# Patient Record
Sex: FEMALE | Race: WHITE | HISPANIC OR LATINO | Employment: OTHER | URBAN - METROPOLITAN AREA
[De-identification: names, ages, dates, MRNs, and addresses within clinical notes are randomized per-mention and may not be internally consistent; named-entity substitution may affect disease eponyms.]

---

## 2024-09-12 ASSESSMENT — ENCOUNTER SYMPTOMS
HEARTBURN: 0
FEVER: 0
COUGH: 0
CHILLS: 0
WHEEZING: 0
PALPITATIONS: 0
DIZZINESS: 0
HEADACHES: 0
SHORTNESS OF BREATH: 0

## 2024-09-13 NOTE — PROGRESS NOTES
Pulmonary Hypertension Clinic- Follow up    Date of Service: 8/17/24    Referring Physician: No ref. provider found    Reason for Consult: Possible CTEPH    Chief Complaint:   Chief Complaint   Patient presents with    Pulmonary Hypertension     Last seen 06/10/24    Results     PFT 08/09/24, sleep appt 11/19/24       HPI:   Laurie Samuel is a 63 y.o. female referred to PH clinic for possible CTEPH, last seen 6/10/24.  She presented 3/29/2024 to [facility] with no significant past medical history who presented today with several weeks of dizziness and headache with shortness of breath. In the ER she was reporting continued symptoms getting in and out of bed. Her vitals are stable, she is saturating well on 2L of oxygen. She denies any personal or family history of VTE or pulmonary issues prior to this. No recent prolonged sedentary periods. She is up to date on her mammograms, colonoscopy and paps without any abnormal findings per daughter at bedside. She has no leg pain or redness.     Obstructive Sleep Apnea screening: STOP BANG high risk for + Snoring, + fatigue (napping), apneas, BP treatment and age > 50.     6/10/24 - Henrietta is feeling much better.  She is no longer requiring oxygen.  She is accompanied by her daughters who prefer to translate for their mom rather than an .  She has some bothersome phlegm production which is random and rare, denies cough.  Denies sinus congestion and GERD (currently, used to have GERD but stopped eating spices and now has resolved).  She is on eliquis which she gets at [facility].  In hospital she had sharp chest pain and palpitations which have both resolved.  She has no LE edema.  She was exposed to burning biomass fuels at her childhood home in [place] but is a never smoker personally. She worked for many years as a  and reports at least one incident of bleach related inhalational injury with difficulty breathing for several days  afterwards. She did not seek medical attention at the time.      Today 9/17/24 - Henrietta is feeling 100% improved.  She is not having shortness of breath or difficulty keeping up with family.  She was unable to get the VQ and echo done, it appears she did not understand it was ordered.  However they are cash pay and her PFT was very expensive.  The PFT is normal with a DLCO that is supranormal and therefore rules out the presence of significant CTEPH.  We discussed today that if [facility] can pay for the VQ or echo they should get it done, otherwise defer until her medicare goes through. If it is expensive I do not want her to get it as it will not .  The PE was unprovoked, therefore anticoagulation for life is recommended.  The PFT is reassuring that no CTEPH is present.  Even in the presence of CTED we can wait for definitive diagnosis until it is more affordable.  At this time I do not suspect that she has either.      No past medical history on file.    Past Surgical History:   Procedure Laterality Date    [place] UPPER GI ENDOSCOPY,DIAGNOSIS N/A 4/2/2024    Procedure: GASTROSCOPY;  Surgeon: Rich Jenkins M.D.;  Location: SURGERY SAME DAY [facility];  Service: Gastroenterology    [place] COLONOSCOPY,DIAGNOSTIC N/A 4/2/2024    Procedure: COLONOSCOPY;  Surgeon: Rich Jenkins M.D.;  Location: SURGERY SAME DAY [facility];  Service: Gastroenterology    [place] UPPER GI ENDOSCOPY,BIOPSY N/A 4/2/2024    Procedure: GASTROSCOPY, WITH BIOPSY;  Surgeon: Rich Jenkins M.D.;  Location: SURGERY SAME DAY [facility];  Service: Gastroenterology    UMBILICAL HERNIA REPAIR      Hernia Repair, Umbilical       Social History     Socioeconomic History    Marital status: Single     Spouse name: Not on file    Number of children: Not on file    Years of education: Not on file    Highest education level: Not on file   Occupational History    Not on file   Tobacco Use    Smoking status: Never    Smokeless tobacco: Never   Vaping Use     Vaping status: Never Used   Substance and Sexual Activity    Alcohol use: Never    Drug use: No    Sexual activity: Not on file   Other Topics Concern    Not on file   Social History Narrative    ** Merged History Encounter **          Social Determinants of Health     Financial Resource Strain: Not on file   Food Insecurity: Not on file   Transportation Needs: Not on file   Physical Activity: Not on file   Stress: Not on file   Social Connections: Not on file   Intimate Partner Violence: Not on file   Housing Stability: Not on file          No family history on file.    Current Outpatient Medications on File Prior to Visit   Medication Sig Dispense Refill    ferrous sulfate 325 (65 Fe) MG tablet Take 325 mg by mouth every 48 hours.      apixaban (ELIQUIS) 5mg Tab Take 1 Tablet by mouth 2 times a day. 60 Tablet 2    lisinopril (PRINIVIL) 20 MG Tab Take 1 Tablet by mouth every day. 30 Tablet 0    rosuvastatin (CRESTOR) 10 MG Tab Take 1 Tablet by mouth every day. 30 Tablet 0     No current facility-administered medications on file prior to visit.       Allergies: Patient has no known allergies.      ROS:   Review of Systems   Constitutional:  Negative for chills, fever and malaise/fatigue.   HENT:  Negative for congestion.    Respiratory:  Negative for cough, sputum production, shortness of breath and wheezing.    Cardiovascular:  Negative for chest pain, palpitations and leg swelling.   Gastrointestinal:  Negative for heartburn.   Neurological:  Negative for dizziness and headaches.   Endo/Heme/Allergies:  Negative for environmental allergies.       Vitals:  /74 (BP Location: Right arm, Patient Position: Sitting, BP Cuff Size: Large adult)   Pulse 77   Ht 1.524 m (5')   Wt 80.7 kg (178 lb)   SpO2 96%     Physical Exam:  Physical Exam  Constitutional:       Appearance: She is obese.   HENT:      Head: Atraumatic.   Eyes:      Extraocular Movements: Extraocular movements intact.   Cardiovascular:      Rate  and Rhythm: Normal rate and regular rhythm.   Pulmonary:      Effort: Pulmonary effort is normal. No respiratory distress.      Breath sounds: Normal breath sounds. No wheezing or rales.   Musculoskeletal:         General: No swelling.   Skin:     General: Skin is warm and dry.   Neurological:      General: No focal deficit present.      Mental Status: She is alert and oriented to person, place, and time.           Vaccinations:    Pneumovax: Vaccinated  Covid: Vaccinated  Annual Flu: Vaccinated    Pertinent Studies:  Laboratory Data:    6MWT:    PFTs as reviewed by me personally show:      Imaging as reviewed by me personally show:    3/29/24:  IMPRESSION:     1. Extensive bilateral pulmonary emboli, sparing the main pulmonary arteries, but involving bilateral lobar, segmental and subsegmental branches.  2. Right heart strain.     No pulmonary nodule to warrant further imaging follow-up recommendations as per Fleischner Society guidelines.    Pertinent Cardiac Studies:  Echo:  3/30/24:  CONCLUSIONS  Underfilled left ventricle with hyperdynamic systolic function.   The right ventricle is mildly dilated. Reduced right ventricular   systolic function.  Evidence of Mayberry's sign with hyperdynamic apex and free wall   dysfunction suggestive of RV strain as seen with acute pulmonary   embolism.  Estimated right ventricular systolic pressure is 68 mmHg; severely   elevated.  No pericardial effusion.     No prior study is available for comparison.     RHC:    Assessment/Plan:    Problem List Items Addressed This Visit       Bilateral pulmonary embolism (HCC)     Symptomatically resolved  Plan:  - Continue lifelong AC for unprovoked PE  ~ According to the PADIS PE trial extending anticoagulation is effective at preventing repeat VTE, but the risk of recurrent PE does not decrease with extended duration after stopping, meaning the risk is relatively high once anticoagulation stops regardless of how long it has been used  prior to termination.    ~The CHEST 2021 guidelines for unprovoked VTE in a patient with low bleeding risk are clear for extended anticoagulation.    ~ According to the CHEST 2016 guidelines the risk of recurrent VTE in a patient with unprovoked pulmonary embolism is 30% at 5 years.   ~ CHEST 2021 guidelines do recommend extended therapy with 1/2 dose NOAC which I believe is reasonable to consider after 6-12months of anticoagulation at full dose.    ~ The bleeding risk of 1/2 dose NOAC therapy is similar to that of aspirin ([facility] Choice Trial)            Pulmonary hypertension (HCC)     Unclear if present still.  DLCO is 140% so regardless it is not clinically significant at this time.  Plan:  - If able to get coverage will still obtain VQ and repeat Echo  - Henrietta may have GONZALEZ which we can pursue if VQ is negative and echo still shows elevated PA pressures  - Follow up in 1 year               Return in about 1 year (around 9/17/2025) for Dr. New in General Pulmonary Clinic .     This note was generated using voice recognition software which has a chance of producing errors of grammar and possibly content.  I have made every reasonable attempt to find and correct any obvious errors, but it should be expected that some may not be found prior to finalization of this note.    Time spent in record review prior to patient arrival, reviewing results, and in face-to-face encounter totaled 30 min, excluding any procedures if performed.      Pretty New MD RD  Pulmonary and Critical Care Medicine  [facility]

## 2024-09-17 ENCOUNTER — OFFICE VISIT (OUTPATIENT)
Dept: SLEEP MEDICINE | Facility: MEDICAL CENTER | Age: 64
End: 2024-09-17
Attending: INTERNAL MEDICINE

## 2024-09-17 VITALS
WEIGHT: 178 LBS | BODY MASS INDEX: 34.95 KG/M2 | HEIGHT: 60 IN | HEART RATE: 77 BPM | DIASTOLIC BLOOD PRESSURE: 74 MMHG | SYSTOLIC BLOOD PRESSURE: 130 MMHG | OXYGEN SATURATION: 96 %

## 2024-09-17 DIAGNOSIS — I26.99 BILATERAL PULMONARY EMBOLISM (HCC): ICD-10-CM

## 2024-09-17 DIAGNOSIS — I27.20 PULMONARY HYPERTENSION (HCC): ICD-10-CM

## 2024-09-17 PROCEDURE — 3075F SYST BP GE 130 - 139MM HG: CPT | Performed by: INTERNAL MEDICINE

## 2024-09-17 PROCEDURE — 99214 OFFICE O/P EST MOD 30 MIN: CPT | Performed by: INTERNAL MEDICINE

## 2024-09-17 PROCEDURE — 3078F DIAST BP <80 MM HG: CPT | Performed by: INTERNAL MEDICINE

## 2024-09-17 PROCEDURE — 99211 OFF/OP EST MAY X REQ PHY/QHP: CPT | Performed by: INTERNAL MEDICINE

## 2024-09-17 ASSESSMENT — ENCOUNTER SYMPTOMS: SPUTUM PRODUCTION: 0

## 2024-09-17 ASSESSMENT — FIBROSIS 4 INDEX: FIB4 SCORE: 1.03

## 2024-09-17 NOTE — ASSESSMENT & PLAN NOTE
Unclear if present still.  DLCO is 140% so regardless it is not clinically significant at this time.  Plan:  - If able to get coverage will still obtain VQ and repeat Echo  - Henrietta may have GONZALEZ which we can pursue if VQ is negative and echo still shows elevated PA pressures  - Follow up in 1 year

## 2024-09-17 NOTE — ASSESSMENT & PLAN NOTE
Symptomatically resolved  Plan:  - Continue lifelong AC for unprovoked PE  ~ According to the PADIS PE trial extending anticoagulation is effective at preventing repeat VTE, but the risk of recurrent PE does not decrease with extended duration after stopping, meaning the risk is relatively high once anticoagulation stops regardless of how long it has been used prior to termination.    ~The CHEST 2021 guidelines for unprovoked VTE in a patient with low bleeding risk are clear for extended anticoagulation.    ~ According to the CHEST 2016 guidelines the risk of recurrent VTE in a patient with unprovoked pulmonary embolism is 30% at 5 years.   ~ CHEST 2021 guidelines do recommend extended therapy with 1/2 dose NOAC which I believe is reasonable to consider after 6-12months of anticoagulation at full dose.    ~ The bleeding risk of 1/2 dose NOAC therapy is similar to that of aspirin ([facility] Choice Trial)

## 2024-10-22 ENCOUNTER — HOSPITAL ENCOUNTER (EMERGENCY)
Facility: MEDICAL CENTER | Age: 64
End: 2024-10-22
Attending: EMERGENCY MEDICINE

## 2024-10-22 VITALS
RESPIRATION RATE: 14 BRPM | WEIGHT: 177.03 LBS | HEIGHT: 64 IN | SYSTOLIC BLOOD PRESSURE: 177 MMHG | HEART RATE: 82 BPM | DIASTOLIC BLOOD PRESSURE: 92 MMHG | BODY MASS INDEX: 30.22 KG/M2 | TEMPERATURE: 98.3 F | OXYGEN SATURATION: 98 %

## 2024-10-22 DIAGNOSIS — H57.12 LEFT EYE PAIN: ICD-10-CM

## 2024-10-22 DIAGNOSIS — H11.31 SUBCONJUNCTIVAL HEMORRHAGE OF RIGHT EYE: ICD-10-CM

## 2024-10-22 PROCEDURE — 99283 EMERGENCY DEPT VISIT LOW MDM: CPT

## 2024-10-22 PROCEDURE — 700101 HCHG RX REV CODE 250: Performed by: EMERGENCY MEDICINE

## 2024-10-22 RX ORDER — PROPARACAINE HYDROCHLORIDE 5 MG/ML
1 SOLUTION/ DROPS OPHTHALMIC ONCE
Status: COMPLETED | OUTPATIENT
Start: 2024-10-22 | End: 2024-10-22

## 2024-10-22 RX ADMIN — FLUORESCEIN SODIUM 1 MG: 1 STRIP OPHTHALMIC at 16:30

## 2024-10-22 RX ADMIN — PROPARACAINE HYDROCHLORIDE 1 DROP: 5 SOLUTION/ DROPS OPHTHALMIC at 16:30

## 2024-10-22 ASSESSMENT — FIBROSIS 4 INDEX: FIB4 SCORE: 1.03

## 2024-10-22 ASSESSMENT — PAIN DESCRIPTION - PAIN TYPE
TYPE: ACUTE PAIN
TYPE: ACUTE PAIN

## 2024-11-19 ENCOUNTER — OFFICE VISIT (OUTPATIENT)
Dept: SLEEP MEDICINE | Facility: MEDICAL CENTER | Age: 64
End: 2024-11-19
Attending: INTERNAL MEDICINE
Payer: MEDICARE

## 2024-11-19 VITALS
WEIGHT: 179 LBS | RESPIRATION RATE: 16 BRPM | HEART RATE: 82 BPM | SYSTOLIC BLOOD PRESSURE: 142 MMHG | HEIGHT: 60 IN | BODY MASS INDEX: 35.14 KG/M2 | DIASTOLIC BLOOD PRESSURE: 86 MMHG | OXYGEN SATURATION: 95 %

## 2024-11-19 DIAGNOSIS — G47.30 SLEEP DISORDER BREATHING: Primary | ICD-10-CM

## 2024-11-19 DIAGNOSIS — R06.89 GASPING FOR BREATH: ICD-10-CM

## 2024-11-19 DIAGNOSIS — I27.20 PULMONARY HYPERTENSION (HCC): ICD-10-CM

## 2024-11-19 DIAGNOSIS — G47.33 OBSTRUCTIVE SLEEP APNEA: ICD-10-CM

## 2024-11-19 DIAGNOSIS — R06.83 SNORING: ICD-10-CM

## 2024-11-19 DIAGNOSIS — F51.04 CHRONIC INSOMNIA: ICD-10-CM

## 2024-11-19 PROCEDURE — 99211 OFF/OP EST MAY X REQ PHY/QHP: CPT | Performed by: INTERNAL MEDICINE

## 2024-11-19 PROCEDURE — 3079F DIAST BP 80-89 MM HG: CPT | Performed by: STUDENT IN AN ORGANIZED HEALTH CARE EDUCATION/TRAINING PROGRAM

## 2024-11-19 PROCEDURE — 99214 OFFICE O/P EST MOD 30 MIN: CPT | Performed by: STUDENT IN AN ORGANIZED HEALTH CARE EDUCATION/TRAINING PROGRAM

## 2024-11-19 PROCEDURE — 3077F SYST BP >= 140 MM HG: CPT | Performed by: STUDENT IN AN ORGANIZED HEALTH CARE EDUCATION/TRAINING PROGRAM

## 2024-11-19 RX ORDER — ZOLPIDEM TARTRATE 5 MG/1
5 TABLET ORAL NIGHTLY PRN
Qty: 1 TABLET | Refills: 0 | Status: SHIPPED | OUTPATIENT
Start: 2024-11-19 | End: 2024-11-20

## 2024-11-19 ASSESSMENT — FIBROSIS 4 INDEX: FIB4 SCORE: 1.03

## 2024-11-19 NOTE — PROGRESS NOTES
[facility] Consult Note     Date: 11/19/2024 / Time: 9:44 AM      Thank you for requesting a sleep medicine consultation on Laurie Holliday at the sleep center. Presents today with the chief complaints of snoring, gasping in sleep, occasional excessive daytime sleepiness. She is referred by Pretty New M.D.  [address] [place] [address] for evaluation and treatment of sleep disorder breathing .     HISTORY OF PRESENT ILLNESS:     Laurie Holliday is a 64 y.o. female with history of pulmonary embolism on Eliquis, hypertension, pulmonary hypertension, obesity, and snoring.  Presents to Sleep Clinic for evaluation of sleep.     She is accompanied by her daughter at today's visit.  They deny formal .    She was admitted earlier this year due to pulmonary embolism.  She has since followed with pulmonology.  There was concern about potential sleep apnea.    She does have difficulty with getting to sleep and sometimes staying asleep.  During her sleep she does snore and she does have episodes of gasping.  She does not feel rested with awakening.  During the day she can have fatigue and low energy and excessive daytime sleepiness.  In addition she can have brain fog and trouble with irritability.  Her sleep is nonrestorative.  She does occasionally wake with headaches.    She does complain of restless legs at times.    As per supplemental questionnaire to be scanned or imported into chart:    [place] Sleepiness Score: 16    Sleep Schedule  Bedtime: Weekday & Weekend 10-11pm   Wake time: Weekday & Weekend 7am  Sleep-onset latency: 1-2 hours   Awakenings from sleep: 2  Difficulty falling back asleep: at times   Bedroom partner: grandchild   Naps: rare     DAYTIME SYMPTOMS:   Excessive daytime sleepiness: Yes  Daytime fatigue: Yes  Difficulty concentrating: Yes  Memory problems: Yes  Irritability:Yes  Work/school performance issues: No  "  Sleepiness with driving: No   Caffeine/stimulant use: Yes  Alcohol use:No     SLEEP RELATED SYMPTOMS  Snoring: Yes  Witnessed apnea or gasping/choking: Yes gasping   Dry mouth or mouth breathing: No   Sweating: No   Teeth grinding/biting: No   Morning headaches: Yes  Refreshed Upon Awakening: No      SLEEP RELATED BEHAVIORS:  Parasomnias (walking, talking, eating, violence): No   Leg kicking: No   Restless legs - \"urge to move\": Yes  Nightmares: Yes Recurrent: No   Dream enactment: No      NARCOLEPSY:  Cataplexy: No   Sleep paralysis: No   Sleep attacks: No   Hypnagogic/hypnopompic hallucinations: No     MEDICAL HISTORY  Past Medical History:   Diagnosis Date    Daytime sleepiness     Gasping for breath     Insomnia     Morning headache     Snoring         SURGICAL HISTORY  Past Surgical History:   Procedure Laterality Date    [place] UPPER GI ENDOSCOPY,DIAGNOSIS N/A 4/2/2024    Procedure: GASTROSCOPY;  Surgeon: Rich Jenkins M.D.;  Location: SURGERY SAME DAY [facility];  Service: Gastroenterology    [place] COLONOSCOPY,DIAGNOSTIC N/A 4/2/2024    Procedure: COLONOSCOPY;  Surgeon: Rich Jenkins M.D.;  Location: SURGERY SAME DAY [facility];  Service: Gastroenterology    [place] UPPER GI ENDOSCOPY,BIOPSY N/A 4/2/2024    Procedure: GASTROSCOPY, WITH BIOPSY;  Surgeon: Rich Jenkins M.D.;  Location: SURGERY SAME DAY [facility];  Service: Gastroenterology    UMBILICAL HERNIA REPAIR      Hernia Repair, Umbilical        FAMILY HISTORY  No family history on file.    SOCIAL HISTORY  Social History     Socioeconomic History    Marital status: Single   Tobacco Use    Smoking status: Never    Smokeless tobacco: Never   Vaping Use    Vaping status: Never Used   Substance and Sexual Activity    Alcohol use: Never    Drug use: No   Social History Narrative    ** Merged History Encounter **             Occupation: retired     CURRENT MEDICATIONS  Current Outpatient Medications   Medication Sig Dispense Refill    zolpidem (AMBIEN) 5 MG Tab Take 1 " Tablet by mouth at bedtime as needed for Sleep (1 tablet po qhs for insomnia/sleep study. Bring to sleep study for one day supply) for up to 1 day. 1 Tablet 0    ferrous sulfate 325 (65 Fe) MG tablet Take 325 mg by mouth every 48 hours.      apixaban (ELIQUIS) 5mg Tab Take 1 Tablet by mouth 2 times a day. 60 Tablet 2    lisinopril (PRINIVIL) 20 MG Tab Take 1 Tablet by mouth every day. 30 Tablet 0    rosuvastatin (CRESTOR) 10 MG Tab Take 1 Tablet by mouth every day. 30 Tablet 0     No current facility-administered medications for this visit.       REVIEW OF SYSTEMS  Constitutional: Denies fevers, Denies weight changes  Ears/Nose/Throat/Mouth: Denies nasal congestion or sore throat   Cardiovascular: Denies chest pain  Respiratory: Exertional shortness of breath, Denies cough  Gastrointestinal/Hepatic: Denies nausea, vomiting  Sleep: see HPI    Physical Examination:  Vitals/ General Appearance:   Weight/BMI: Body mass index is 34.96 kg/m².  BP (!) 142/86 (BP Location: Left arm, Patient Position: Sitting, BP Cuff Size: Adult)   Pulse 82   Resp 16   Ht 1.524 m (5')   Wt 81.2 kg (179 lb)   SpO2 95%   Vitals:    11/19/24 0941   BP: (!) 142/86   BP Location: Left arm   Patient Position: Sitting   BP Cuff Size: Adult   Pulse: 82   Resp: 16   SpO2: 95%   Weight: 81.2 kg (179 lb)   Height: 1.524 m (5')       Pt. is alert and oriented to time, place and person. Cooperative and in no apparent distress.     Constitutional: Alert, no distress, well-groomed.  Skin: No rashes in visible areas.  Eye: Round. Conjunctiva clear, lids normal. No icterus.   ENT EXAM  Nasal alae/valves collapsible: No   Nasal septum deviation: Yes  Nasal turbinate hypertrophy:No  Hard palate narrow: No   Hard palate high: No   Soft palate/uvula (Mallampati score): 4  Tongue Scalloping: No   Retrognathia: No   Micrognathia: No   Cardiovascular:no murmus/gallops/rubs, normal S1 and S2 heart sounds, regular rate and rhythm  Pulmonary:Clear to  auscultation, No wheezes, No crackles.  Neurologic:Awake, alert and oriented x 3, Normal age appropriate gait, No involuntary motions.  Extremities: No clubbing, cyanosis, or edema       ASSESSMENT AND PLAN   Laurie Holliday is a 64 y.o. female with history of pulmonary embolism on Eliquis, hypertension, pulmonary hypertension, obesity, and snoring.  Presents to Sleep Clinic for evaluation of sleep.     1) Laurie Holliday  has symptoms of Obstructive Sleep Apnea (GONZALEZ). Laurie Holliday has symptoms of snoring, choking/gasping during sleep, daytime sleepiness morning headaches, unrefreshed upon awakening. These can interfere with activities of daily living.   ESS 16  Pt has risk factors for GONZALEZ include obesity, age, and crowded oropharynx.     The pathophysiology of GONZALEZ and the increased risk of cardiovascular morbidity from untreated GONZALEZ is discussed in detail with the patient. She  also has HTN, pulmonary hypertension which can be worsened by GONZALEZ.      We have discussed diagnostic options including in-laboratory, attended polysomnography and home sleep testing. We have also discussed treatment options including airway pressurization, reconstructive otolaryngologic surgery, dental appliances and weight management.     Subsequently,treatment options will be discussed based on the diagnostic study. Meanwhile, She is urged to avoid supine sleep, weight gain and alcoholic beverages since all of these can worsen GONZALEZ. She is cautioned against drowsy driving. If She feels sleepy while driving, advised must pull over for a break/nap, rather than persist on the road, in the interest of Pt's own safety and that of others on the road.    PLAN  -  She  will be scheduled for an overnight PSG  to assess sleep related breathing disorder.  -Sleep date prescribed for night of sleep study only  - Follow up 1-2 weeks after sleep study to discuss results and treatment options moving forward    -Advised to reach out via [facility] or by phone with any questions or concerns.     Chronic Insomnia   With prolonged sleep latency and feeling this is impacting daily functioning for years meets criteria for chronic insomnia. Discussed potential causes of insomnia and importance of having a routine around bedtime.  Reviewed that untreated sleep apnea can worsen insomnia.  Would recommend treating sleep apnea present and seeing if insomnia improves    RECOMMENDATIONS  -Maintain a regular sleep schedule  -Avoid caffeinated beverages after lunch, and alcohol in late afternoon and evening  -Avoid prolonged use of light-emitting screens before bedtime  -Exercise regularly for at least 20 minutes, preferably more than four to five hours prior to bedtime  -Avoid daytime naps, especially if they are longer than 20 to 30 minutes or occur late in the day      Please note portions of this record was created using voice recognition software. I have made every reasonable attempt to correct obvious errors, but I expect that there are errors of grammar and possibly content I did not discover before finalizing the note.

## 2024-11-20 ENCOUNTER — TELEPHONE (OUTPATIENT)
Dept: HEALTH INFORMATION MANAGEMENT | Facility: OTHER | Age: 64
End: 2024-11-20
Payer: MEDICARE

## 2024-12-01 ENCOUNTER — APPOINTMENT (OUTPATIENT)
Dept: SLEEP MEDICINE | Facility: MEDICAL CENTER | Age: 64
End: 2024-12-01
Attending: STUDENT IN AN ORGANIZED HEALTH CARE EDUCATION/TRAINING PROGRAM
Payer: MEDICARE

## 2024-12-01 DIAGNOSIS — G47.30 SLEEP DISORDER BREATHING: ICD-10-CM

## 2024-12-01 DIAGNOSIS — R06.89 GASPING FOR BREATH: ICD-10-CM

## 2024-12-01 DIAGNOSIS — R06.83 SNORING: ICD-10-CM

## 2024-12-01 DIAGNOSIS — I27.20 PULMONARY HYPERTENSION (HCC): ICD-10-CM

## 2024-12-01 PROCEDURE — 95810 POLYSOM 6/> YRS 4/> PARAM: CPT | Performed by: STUDENT IN AN ORGANIZED HEALTH CARE EDUCATION/TRAINING PROGRAM

## 2024-12-02 NOTE — PROCEDURES
Patient: INNA CALDERÓN  ID: [id number] Date: 12/1/2024   MONTAGE: Standard  STUDY TYPE: Diagnostic  RECORDING TECHNIQUE:   After the scalp was prepared, gold plated electrodes were applied to the scalp according to the International 10-20 System. EEG (electroencephalogram) was continuously monitored from the O1-M2, O2-M1, C3-M2, C4-M1, F3-M2, and F4-M1. EOGs (electrooculograms) were monitored by electrodes placed at the left and right outer canthi. Chin EMG (electromyogram) was monitored by electrodes placed on the mentalis and sub-mentalis muscles. Nasal and oral airflow were monitored using a triple port thermocouple as well as oronasal pressure transducer. Respiratory effort was measured by inductive plethysmography technology employing abdominal and thoracic belts. Blood oxygen saturation and pulse were monitored by pulse oximetry. Heart rhythm was monitored by surface electrocardiogram. Leg EMG was studied using surface electrodes placed on left and right anterior tibialis. A microphone was used to monitor tracheal sounds and snoring. Body position was monitored and documented by technician observation.   SCORING CRITERIA:   A modification of the AASM manual for scoring of sleep and associated events was used. Obstructive apneas were scored by cessation of airflow for at least 10 seconds with continuing respiratory effort. Central apneas were scored by cessation of airflow for at least 10 seconds with no respiratory effort. Hypopneas were scored by a 30% or more reduction in airflow for at least 10 seconds accompanied by arterial oxygen desaturation of 3% or an arousal. For CMS (Medicare) patients, per AASM rule 1B, hypopneas are scored by 30% with mild reduction in airflow for at least 10 seconds accompanied by arterial saturation decreased at 4%.    Study start time was 08:34:38 PM. Diagnostic recording time was 9h 3.5m with a total sleep time of 7h 10.0m resulting in a sleep efficiency of  79.12%%. Sleep latency from the start of the study was 25 minutes and the latency from sleep to REM was 65 minutes. In total,64 arousals were scored for an arousal index of 8.9.  Respiratory:  There were a total of 3 apneas consisting of 3 obstructive apneas, 0 mixed apneas, and 0 central apneas. A total of 56 hypopneas were scored. The apnea index was 0.42 per hour and the hypopnea index was 7.81 per hour resulting in an overall 4% AHI of 8.23. AHI during REM was 23.9 and AHI while supine was 11.24.  Oximetry:  There was a mean oxygen saturation of 93.0%. The minimum oxygen saturation in NREM was 89.0 % and in REM was 78.0%. The patient spent 11.2 minutes of TST with SaO2 <88%.  Cardiac:  The highest heart rate seen while awake was 85 BPM while the highest heart rate during sleep was 84 BPM with an average sleeping heart rate of 70 BPM.  Limb Movements:  There were a total of 0 PLMs during sleep which resulted in a PLMS index of 0.0. Of these, 5 were associated with arousals which resulted in a PLMS arousal index of 0.7.    Impression:  1.  Mild obstructive sleep apnea with an overall 4% AHI of 8.2 events an hour  2.  Respiratory events clustered around REM sleep with a REM AHI of 24 events an hour  3.  Supine REM sleep was seen during night of study  4.  Mild nocturnal hypoxia with time at or below 88% saturation of 11 minutes    Recommendations:  I recommend the patient consider return for a CPAP/BiPAP titration.   Patient may be a candidate for empiric home auto CPAP therapy.    In some cases alternative treatment options may be proven effective in resolving sleep apnea. These options include upper airway surgery, the use of a dental orthotic, weight loss, or positional therapy. Clinical correlation is required. In general patients with sleep apnea are advised to avoid alcohol, sedatives and not to operate a motor vehicle while drowsy.  Untreated sleep apnea increases the risk for cardiovascular and  neurovascular disease.

## 2024-12-05 ENCOUNTER — TELEPHONE (OUTPATIENT)
Dept: HEALTH INFORMATION MANAGEMENT | Facility: OTHER | Age: 64
End: 2024-12-05
Payer: MEDICARE

## 2024-12-24 ENCOUNTER — HOSPITAL ENCOUNTER (OUTPATIENT)
Dept: RADIOLOGY | Facility: MEDICAL CENTER | Age: 64
End: 2024-12-24
Attending: NURSE PRACTITIONER
Payer: MEDICARE

## 2024-12-24 DIAGNOSIS — Z12.31 VISIT FOR SCREENING MAMMOGRAM: ICD-10-CM

## 2024-12-24 PROCEDURE — 77067 SCR MAMMO BI INCL CAD: CPT

## 2025-01-06 ENCOUNTER — HOSPITAL ENCOUNTER (OUTPATIENT)
Dept: RADIOLOGY | Facility: MEDICAL CENTER | Age: 65
End: 2025-01-06
Attending: INTERNAL MEDICINE
Payer: MEDICARE

## 2025-01-06 ENCOUNTER — OFFICE VISIT (OUTPATIENT)
Dept: SLEEP MEDICINE | Facility: MEDICAL CENTER | Age: 65
End: 2025-01-06
Attending: PHYSICIAN ASSISTANT
Payer: MEDICARE

## 2025-01-06 VITALS
DIASTOLIC BLOOD PRESSURE: 82 MMHG | HEART RATE: 76 BPM | HEIGHT: 60 IN | BODY MASS INDEX: 35.14 KG/M2 | WEIGHT: 179 LBS | OXYGEN SATURATION: 96 % | RESPIRATION RATE: 16 BRPM | SYSTOLIC BLOOD PRESSURE: 126 MMHG

## 2025-01-06 DIAGNOSIS — I27.20 PULMONARY HYPERTENSION (HCC): ICD-10-CM

## 2025-01-06 DIAGNOSIS — I26.94 MULTIPLE SUBSEGMENTAL PULMONARY EMBOLI WITHOUT ACUTE COR PULMONALE (HCC): ICD-10-CM

## 2025-01-06 DIAGNOSIS — G47.33 OBSTRUCTIVE SLEEP APNEA: ICD-10-CM

## 2025-01-06 PROCEDURE — A9540 TC99M MAA: HCPCS

## 2025-01-06 PROCEDURE — 99213 OFFICE O/P EST LOW 20 MIN: CPT | Performed by: PHYSICIAN ASSISTANT

## 2025-01-06 PROCEDURE — 71045 X-RAY EXAM CHEST 1 VIEW: CPT

## 2025-01-06 PROCEDURE — 3074F SYST BP LT 130 MM HG: CPT | Performed by: PHYSICIAN ASSISTANT

## 2025-01-06 PROCEDURE — 99212 OFFICE O/P EST SF 10 MIN: CPT | Performed by: PHYSICIAN ASSISTANT

## 2025-01-06 PROCEDURE — 3079F DIAST BP 80-89 MM HG: CPT | Performed by: PHYSICIAN ASSISTANT

## 2025-01-06 ASSESSMENT — ENCOUNTER SYMPTOMS
HEARTBURN: 0
SINUS PAIN: 0
WEIGHT LOSS: 0
PALPITATIONS: 1
FEVER: 0
CHILLS: 0
INSOMNIA: 1
COUGH: 0
TREMORS: 0
WHEEZING: 0
DIZZINESS: 1
SORE THROAT: 0
ORTHOPNEA: 0
SPUTUM PRODUCTION: 0
HEADACHES: 0
SHORTNESS OF BREATH: 0

## 2025-01-06 ASSESSMENT — FIBROSIS 4 INDEX: FIB4 SCORE: 1.03

## 2025-01-06 NOTE — PROGRESS NOTES
Chief Complaint   Patient presents with    Apnea     Last Office Visit 11/19/24 with     Study Complete on 12/1/24    Polysomnography, 4 or More         HPI:  Laurie Holliday is a 64 y.o. year old female here today for follow-up on sleep study results.  Last seen in clinic by Dr. Dipak Canales on 11/19/2024.  Patient is Romansh-speaking declined , accompanied by daughter who provides translation.    Past Medical History: Bilateral PE, acute respiratory failure, pulmonary hypertension, anemia, type 2 diabetes, hypertension.    Vitals:  /82 (BP Location: Left arm, Patient Position: Sitting, BP Cuff Size: Adult)   Pulse 76   Resp 16   Ht 1.524 m (5')   Wt 81.2 kg (179 lb)   SpO2 96%     Recent Imaging: Chest x-ray 1/6/2024 demonstrated cardiomegaly, hiatal hernia, no acute pulmonary changes.    Echocardiogram obtained 3/30/2024 demonstrated normal left ventricular chamber size, diastolic function.  Mild concentric left ventricular hypertrophy, hyperdynamic left ventricular systolic function with mid left ventricular cavity obliteration.  LVEF estimated at 74%.  Mildly dilated right ventricle with reduced right ventricular systolic function, hyperdynamic apex and free wall dysfunction suggesting right ventricular strain as seen with acute PE.  Enlarged right atrium.  Trace mitral regurgitation, moderate tricuspid regurgitation estimated RVSP of 68 mmHg, right heart pressures consistent with severe pulmonary hypertension.  Mild pulmonic insufficiency.    Polysomnogram obtained 12/1/2024 demonstrated overall AHI of 8.23 increasing to 23.9 during REM sleep and 11.24 while supine.  Low O2 sat 78% with sats less than or equal to 88 for 11.2 minutes of recorded time.  Findings consistent with mild obstructive sleep apnea and mild nocturnal hypoxia.  CPAP titration versus auto CPAP trial recommended.    Sleep schedule goes to bed 10-3 a.m., wakens 7:30 AM , and gets up during the  night bathroom x 2   Symptoms denies morning headache, experiences daytime somnolence , insomnia, and fatigue.  Patient was emotional in clinic, support given.    [place] Sleepiness Scale reported as 16/24 on 11/19/2024  Stop Bang Score 3 (4/2/2024  2:31 PM)         Review of Systems   Constitutional:  Negative for chills, fever, malaise/fatigue and weight loss.   HENT:  Positive for tinnitus (occasional). Negative for congestion, hearing loss, nosebleeds, sinus pain and sore throat.    Respiratory:  Negative for cough, sputum production, shortness of breath (resolved on eliquis) and wheezing.    Cardiovascular:  Positive for palpitations. Negative for chest pain, orthopnea and leg swelling.   Gastrointestinal:  Negative for heartburn.        Upper dentures, missing all the molars bottom, no swallowing issues    Neurological:  Positive for dizziness (occasional, when lying down). Negative for tremors and headaches.   Psychiatric/Behavioral:  The patient has insomnia.        Past Medical History:   Diagnosis Date    Daytime sleepiness     Gasping for breath     Insomnia     Morning headache     Snoring        Past Surgical History:   Procedure Laterality Date    [place] UPPER GI ENDOSCOPY,DIAGNOSIS N/A 4/2/2024    Procedure: GASTROSCOPY;  Surgeon: Rich Jenkins M.D.;  Location: SURGERY SAME DAY [facility];  Service: Gastroenterology    [place] COLONOSCOPY,DIAGNOSTIC N/A 4/2/2024    Procedure: COLONOSCOPY;  Surgeon: Rich Jenkins M.D.;  Location: SURGERY SAME DAY [facility];  Service: Gastroenterology    [place] UPPER GI ENDOSCOPY,BIOPSY N/A 4/2/2024    Procedure: GASTROSCOPY, WITH BIOPSY;  Surgeon: Rich Jenkins M.D.;  Location: SURGERY SAME DAY [facility];  Service: Gastroenterology    UMBILICAL HERNIA REPAIR      Hernia Repair, Umbilical       No family history on file.    Social History     Socioeconomic History    Marital status: Single     Spouse name: Not on file    Number of children: Not on file    Years of education: Not  on file    Highest education level: Not on file   Occupational History    Not on file   Tobacco Use    Smoking status: Never    Smokeless tobacco: Never   Vaping Use    Vaping status: Never Used   Substance and Sexual Activity    Alcohol use: Never    Drug use: No    Sexual activity: Not on file   Other Topics Concern    Not on file   Social History Narrative    ** Merged History Encounter **          Social Drivers of Health     Financial Resource Strain: Not on file   Food Insecurity: Not on file   Transportation Needs: Not on file   Physical Activity: Not on file   Stress: Not on file   Social Connections: Not on file   Intimate Partner Violence: Not on file   Housing Stability: Not on file       Allergies as of 01/06/2025    (No Known Allergies)          Current medications as of today   Current Outpatient Medications   Medication Sig Dispense Refill    ferrous sulfate 325 (65 Fe) MG tablet Take 325 mg by mouth every 48 hours.      apixaban (ELIQUIS) 5mg Tab Take 1 Tablet by mouth 2 times a day. 60 Tablet 2    lisinopril (PRINIVIL) 20 MG Tab Take 1 Tablet by mouth every day. 30 Tablet 0    rosuvastatin (CRESTOR) 10 MG Tab Take 1 Tablet by mouth every day. 30 Tablet 0     No current facility-administered medications for this visit.         Physical Exam:   Gen:           Alert and oriented, No apparent distress. Mood and affect appropriate, normal interaction with examiner.   Hearing:     Grossly intact.  Nose:          Normal, no lesions or deformities.  Dentition:    poor dentition.   Oropharynx:   Tongue normal, posterior pharynx without erythema or exudate.  Mallampati Classification: IV  Neck:        Supple, trachea midline, no masses.  Respiratory Effort: No intercostal retractions or use of accessory muscles.   Gait and Station: Grossly normal.  Digits and Nails: No clubbing, cyanosis, petechiae, or nodes.   Skin:        No rashes, lesions or ulcers noted.               Ext:           No cyanosis or  edema.      Immunizations:  Flu: Annual recommended  PCV 20: 9/18/2023  Moderna SARS CoV2 Vaccine: 10/5/2021, 9/14/2021    Assessment / Plan:  1. Obstructive sleep apnea    Reviewed sleep study results, findings consistent with mild obstructive sleep apnea and nocturnal hypoxia.  Reviewed risks associated untreated or undertreated sleep apnea.  Recommendation made for auto CPAP trial.  Reviewed strategies for success including minimum usage requirements for insurance purposes of 4 hours/day most days as well as therapeutic goals.  Short-term follow-up to review device usage.  Patient instructed to bring entire device to clinic for that first follow-up visit.  Will send order for auto CPAP to [employer], patient states understanding.  Follow-up in 3 months      Follow-up:   Return in about 3 months (around 4/6/2025) for Return with Svetlana Cervantes PA-C.    Please note that this dictation was created using voice recognition software. I have made every reasonable attempt to correct obvious errors, but it is possible there are errors of grammar and possibly content that I did not discover before finalizing the note.

## 2025-01-06 NOTE — PATIENT INSTRUCTIONS
1-reviewed sleep study results  2-recommendation for auto cpap for mild sleep apnea and nocturnal hypoxia   3-strategies for success   Minimum usage requirements is 4 hours per day most days    Therapeutic goal is 6-6.5 hours per day every day    Training period for first 2 days, wear for 30 min to 2 hours while awake  4-short term follow up to review usage   5-contact info for [employer]   6-follow up in 3 months

## 2025-01-09 ENCOUNTER — APPOINTMENT (OUTPATIENT)
Dept: NEUROLOGY | Facility: MEDICAL CENTER | Age: 65
End: 2025-01-09
Attending: PSYCHIATRY & NEUROLOGY
Payer: MEDICARE

## 2025-02-06 ENCOUNTER — HOSPITAL ENCOUNTER (OUTPATIENT)
Dept: CARDIOLOGY | Facility: MEDICAL CENTER | Age: 65
End: 2025-02-06
Attending: INTERNAL MEDICINE
Payer: MEDICARE

## 2025-02-06 DIAGNOSIS — I27.20 PULMONARY HYPERTENSION (HCC): ICD-10-CM

## 2025-02-06 DIAGNOSIS — I26.94 MULTIPLE SUBSEGMENTAL PULMONARY EMBOLI WITHOUT ACUTE COR PULMONALE (HCC): ICD-10-CM

## 2025-02-06 PROCEDURE — 93306 TTE W/DOPPLER COMPLETE: CPT

## 2025-02-07 LAB
LV EJECT FRACT  99904: 70
LV EJECT FRACT MOD 2C 99903: 72.34
LV EJECT FRACT MOD 4C 99902: 69.85
LV EJECT FRACT MOD BP 99901: 70.42

## 2025-02-07 PROCEDURE — 93306 TTE W/DOPPLER COMPLETE: CPT | Mod: 26 | Performed by: INTERNAL MEDICINE

## 2025-02-10 ENCOUNTER — HOSPITAL ENCOUNTER (OUTPATIENT)
Facility: MEDICAL CENTER | Age: 65
End: 2025-02-10
Attending: NURSE PRACTITIONER
Payer: MEDICARE

## 2025-02-10 ENCOUNTER — PHARMACY VISIT (OUTPATIENT)
Dept: PHARMACY | Facility: MEDICAL CENTER | Age: 65
End: 2025-02-10
Payer: COMMERCIAL

## 2025-02-10 ENCOUNTER — OFFICE VISIT (OUTPATIENT)
Dept: URGENT CARE | Facility: CLINIC | Age: 65
End: 2025-02-10
Payer: MEDICARE

## 2025-02-10 VITALS
BODY MASS INDEX: 33.32 KG/M2 | OXYGEN SATURATION: 97 % | SYSTOLIC BLOOD PRESSURE: 126 MMHG | HEART RATE: 89 BPM | WEIGHT: 176.5 LBS | TEMPERATURE: 97.7 F | DIASTOLIC BLOOD PRESSURE: 78 MMHG | HEIGHT: 61 IN | RESPIRATION RATE: 19 BRPM

## 2025-02-10 DIAGNOSIS — R39.9 UTI SYMPTOMS: ICD-10-CM

## 2025-02-10 LAB
APPEARANCE UR: NORMAL
BILIRUB UR STRIP-MCNC: NEGATIVE MG/DL
COLOR UR AUTO: NORMAL
GLUCOSE UR STRIP.AUTO-MCNC: NEGATIVE MG/DL
KETONES UR STRIP.AUTO-MCNC: NEGATIVE MG/DL
LEUKOCYTE ESTERASE UR QL STRIP.AUTO: NORMAL
NITRITE UR QL STRIP.AUTO: NEGATIVE
PH UR STRIP.AUTO: 5.5 [PH] (ref 5–8)
PROT UR QL STRIP: >=300 MG/DL
RBC UR QL AUTO: NORMAL
SP GR UR STRIP.AUTO: 1.01
UROBILINOGEN UR STRIP-MCNC: 0.2 MG/DL

## 2025-02-10 PROCEDURE — RXOTC WILLOW AMBULATORY OTC CHARGE

## 2025-02-10 PROCEDURE — 87186 SC STD MICRODIL/AGAR DIL: CPT

## 2025-02-10 PROCEDURE — 3078F DIAST BP <80 MM HG: CPT | Performed by: NURSE PRACTITIONER

## 2025-02-10 PROCEDURE — RXMED WILLOW AMBULATORY MEDICATION CHARGE: Performed by: NURSE PRACTITIONER

## 2025-02-10 PROCEDURE — 99203 OFFICE O/P NEW LOW 30 MIN: CPT | Performed by: NURSE PRACTITIONER

## 2025-02-10 PROCEDURE — 81002 URINALYSIS NONAUTO W/O SCOPE: CPT | Performed by: NURSE PRACTITIONER

## 2025-02-10 PROCEDURE — 3074F SYST BP LT 130 MM HG: CPT | Performed by: NURSE PRACTITIONER

## 2025-02-10 PROCEDURE — 87077 CULTURE AEROBIC IDENTIFY: CPT

## 2025-02-10 PROCEDURE — 87086 URINE CULTURE/COLONY COUNT: CPT

## 2025-02-10 RX ORDER — CEFDINIR 300 MG/1
300 CAPSULE ORAL 2 TIMES DAILY
Qty: 10 CAPSULE | Refills: 0 | Status: SHIPPED | OUTPATIENT
Start: 2025-02-10 | End: 2025-02-15

## 2025-02-10 ASSESSMENT — FIBROSIS 4 INDEX: FIB4 SCORE: 1.03

## 2025-02-11 ASSESSMENT — ENCOUNTER SYMPTOMS
BACK PAIN: 0
CHILLS: 0
NAUSEA: 0
FLANK PAIN: 0

## 2025-02-12 NOTE — PROGRESS NOTES
Subjective:   Laurie Holliday is a 64 y.o. female who presents for Dysuria (Sx started today with painful urination, burning, blood in urine, frequent urination, urine retention, pelvic pressure, )      Dysuria   This is a new problem. The current episode started today. The problem occurs every urination. The quality of the pain is described as burning. The pain is mild. She is Not sexually active. There is No history of pyelonephritis. Associated symptoms include urgency. Pertinent negatives include no chills, flank pain, frequency, hematuria, nausea or possible pregnancy. The treatment provided no relief. There is no history of kidney stones or recurrent UTIs.       Review of Systems   Constitutional:  Negative for chills.   Gastrointestinal:  Negative for nausea.   Genitourinary:  Positive for dysuria and urgency. Negative for flank pain, frequency and hematuria.   Musculoskeletal:  Negative for back pain.       Medications:    apixaban Tabs  cefdinir Caps  ferrous sulfate  lisinopril Tabs  rosuvastatin Tabs    Allergies: Patient has no known allergies.    Problem List: Laurie Holliday does not have any pertinent problems on file.    Surgical History:  Past Surgical History:   Procedure Laterality Date    [place] UPPER GI ENDOSCOPY,DIAGNOSIS N/A 4/2/2024    Procedure: GASTROSCOPY;  Surgeon: Rich Jenkins M.D.;  Location: SURGERY SAME DAY [facility];  Service: Gastroenterology    [place] COLONOSCOPY-FLEXIBLE N/A 4/2/2024    Procedure: COLONOSCOPY;  Surgeon: Rich Jenkins M.D.;  Location: SURGERY SAME DAY [facility];  Service: Gastroenterology    [place] UPPER GI ENDOSCOPY,BIOPSY N/A 4/2/2024    Procedure: GASTROSCOPY, WITH BIOPSY;  Surgeon: Rich Jenkins M.D.;  Location: SURGERY SAME DAY [facility];  Service: Gastroenterology    UMBILICAL HERNIA REPAIR      Hernia Repair, Umbilical       Past Social Hx: Laurie Holliday  reports that she has never smoked. She has never used smokeless  "tobacco. She reports that she does not drink alcohol and does not use drugs.     Past Family Hx:  Laurie Holliday family history is not on file.     Problem list, medications, and allergies reviewed by myself today in Epic.     Objective:     /78   Pulse 89   Temp 36.5 °C (97.7 °F) (Temporal)   Resp 19   Ht 1.54 m (5' 0.63\")   Wt 80.1 kg (176 lb 8 oz)   SpO2 97%   BMI 33.76 kg/m²     Physical Exam  Constitutional:       Appearance: Normal appearance. She is not ill-appearing or toxic-appearing.   HENT:      Head: Normocephalic.      Right Ear: External ear normal.      Left Ear: External ear normal.      Nose: Nose normal.      Mouth/Throat:      Lips: Pink.   Eyes:      General: Lids are normal.   Pulmonary:      Effort: Pulmonary effort is normal. No accessory muscle usage.   Abdominal:      Tenderness: There is no abdominal tenderness. There is no right CVA tenderness or left CVA tenderness.   Musculoskeletal:      Cervical back: Full passive range of motion without pain.   Neurological:      Mental Status: She is alert and oriented to person, place, and time.   Psychiatric:         Mood and Affect: Mood normal.         Thought Content: Thought content normal.         Assessment/Plan:     Diagnosis and associated orders:     1. UTI symptoms  POCT Urinalysis    URINE CULTURE(NEW)    cefdinir (OMNICEF) 300 MG Cap         Comments/MDM:     Results for orders placed or performed in visit on 02/10/25   POCT Urinalysis    Collection Time: 02/10/25  8:37 PM   Result Value Ref Range    POC Color red Negative    POC Appearance cloudy Negative    POC Glucose negative Negative mg/dL    POC Bilirubin negative Negative mg/dL    POC Ketones negative Negative mg/dL    POC Specific Gravity 1.015 <1.005 - >1.030    POC Blood large Negative    POC Urine PH 5.5 5.0 - 8.0    POC Protein >=300 Negative mg/dL    POC Urobiligen 0.2 Negative (0.2) mg/dL    POC Nitrites negative Negative    POC Leukocyte " Esterase trace Negative       Pt. Was given ABX therapy today and will change therapy if culture indicates this is necessary. ER precautions given- worsening symptoms, back pain, abd. Pain, or fevers.   Pt. Is to increase fluids, and take the complete duration of the therapy.   Differential diagnosis, natural history, supportive care, and indications for immediate follow-up discussed.               Please note that this dictation was created using voice recognition software. I have made a reasonable attempt to correct obvious errors, but I expect that there are errors of grammar and possibly content that I did not discover before finalizing the note.    This note was electronically signed by Blaze STEVENS.

## 2025-02-13 LAB
BACTERIA UR CULT: ABNORMAL
BACTERIA UR CULT: ABNORMAL
SIGNIFICANT IND 70042: ABNORMAL
SITE SITE: ABNORMAL
SOURCE SOURCE: ABNORMAL

## 2025-02-20 DIAGNOSIS — Z79.01 CHRONIC ANTICOAGULATION: ICD-10-CM

## 2025-04-04 ENCOUNTER — TELEPHONE (OUTPATIENT)
Dept: VASCULAR LAB | Facility: MEDICAL CENTER | Age: 65
End: 2025-04-04
Payer: MEDICARE

## 2025-04-04 NOTE — LETTER
Laurie Holliday  [address]  [place] [address]      04/04/25    Dear Laurie Holliday,     Based on our records, you may be on an anticoagulant (blood thinner) such as   warfarin (Coumadin, Jantoven)   rivaroxaban (Xarelto)   apixaban (Eliquis)   dabigatran (Pradaxa)       Without proper monitoring, these medications can place you at increased risk of bleeding.  We would like to continue monitoring this medication for safety and efficacy reasons.  To schedule an appointment to get the appropriate follow up, please call us at [phone].     If you moved away from the area and/or are no longer on an anticoagulant (blood thinner) because a health care professional instructed you to stop, please help us update our records to reflect the change by also calling us at [phone].     If we are unable to contact you through repeated occasions, you are at risk of being discharged from the Anticoagulation Service.     If you already have an upcoming appointment scheduled, please disregard this notification.    Thank you for your time!      Sincerely,     Federica Garcia, PharmD, BCACP   Clinic Supervisor   [facility]   Outpatient Anticoagulation Service

## 2025-04-04 NOTE — TELEPHONE ENCOUNTER
Pt is overdue for a DOAC follow up visit    Sent letter.    Left message with emergency contact asking to call us back.    3rd attempt    Discharge criteria:  3 calls (including at least once to emergency contacts)? Yes  1 letter (with the third call)? Yes  Over 3 months (last seen 05/31/24, if no response by 10/31/24 , will d/c from clinic)? Yes    Pt has not responded to multiple attempts/calls to establish care. Will discharge from [facility].    Federica Garcia, PharmD    CC Maki Bowers, ANAHI.[email or web address].    
General (Pediatric)

## 2025-04-07 ENCOUNTER — APPOINTMENT (OUTPATIENT)
Dept: NEUROLOGY | Facility: MEDICAL CENTER | Age: 65
End: 2025-04-07
Attending: PSYCHIATRY & NEUROLOGY
Payer: MEDICARE

## 2025-04-07 ENCOUNTER — OFFICE VISIT (OUTPATIENT)
Dept: SLEEP MEDICINE | Facility: MEDICAL CENTER | Age: 65
End: 2025-04-07
Attending: PHYSICIAN ASSISTANT
Payer: MEDICARE

## 2025-04-07 VITALS
BODY MASS INDEX: 33.96 KG/M2 | DIASTOLIC BLOOD PRESSURE: 74 MMHG | HEIGHT: 60 IN | OXYGEN SATURATION: 96 % | RESPIRATION RATE: 16 BRPM | WEIGHT: 173 LBS | SYSTOLIC BLOOD PRESSURE: 122 MMHG | HEART RATE: 70 BPM

## 2025-04-07 DIAGNOSIS — G47.33 OBSTRUCTIVE SLEEP APNEA: ICD-10-CM

## 2025-04-07 DIAGNOSIS — E66.811 OBESITY (BMI 30.0-34.9): ICD-10-CM

## 2025-04-07 PROCEDURE — 99213 OFFICE O/P EST LOW 20 MIN: CPT | Performed by: PHYSICIAN ASSISTANT

## 2025-04-07 PROCEDURE — 3074F SYST BP LT 130 MM HG: CPT | Performed by: PHYSICIAN ASSISTANT

## 2025-04-07 PROCEDURE — 3078F DIAST BP <80 MM HG: CPT | Performed by: PHYSICIAN ASSISTANT

## 2025-04-07 ASSESSMENT — ENCOUNTER SYMPTOMS
FEVER: 0
SPUTUM PRODUCTION: 0
COUGH: 0
WHEEZING: 0
DIZZINESS: 0
SINUS PAIN: 0
ORTHOPNEA: 0
HEARTBURN: 1
HEADACHES: 0
WEIGHT LOSS: 0
TREMORS: 0
SHORTNESS OF BREATH: 0
INSOMNIA: 1
CHILLS: 0
SORE THROAT: 0
PALPITATIONS: 0

## 2025-04-07 ASSESSMENT — FIBROSIS 4 INDEX: FIB4 SCORE: 1.03

## 2025-04-07 NOTE — PROGRESS NOTES
Chief Complaint   Patient presents with    Apnea     Last Office Visit 1/6/25 with Svetlana Cervantes P.A.-C.    1st compliance    PAP/O2/OAT: Auto CPAP cmH20 5-15    Set up: 2/14/25       HPI:  Laurie Holliday is a 64 y.o. year old female here today for follow-up on obstructive sleep apnea and first compliance.  Last seen in clinic 1/6/2025 by EFREN Odonnell.  Previously evaluated by Dr. Dipak Salazar on 11/19/2024.  Patient is [place]ar[place]-speaking clients  and is accompanied by her daughter who provides translation for appointment.    Past Medical History: Bilateral PE, acute respiratory failure, pulmonary hypertension, anemia, type 2 diabetes, hypertension.    Vitals:  /74 (BP Location: Left arm, Patient Position: Sitting, BP Cuff Size: Adult)   Pulse 70   Resp 16   Ht 1.524 m (5')   Wt 78.5 kg (173 lb)   SpO2 96% BMI of 33.79 kg/m²    Recent Imaging: No echocardiogram obtained 2/6/2025 demonstrated normal left ventricular chamber size, wall thickness, systolic function.  LVEF estimated at 70%.  Indeterminate diastolic function.  Normal right ventricular size and systolic function.  Agitated saline study with no evidence of right-to-left shunt.  Trace mitral regurgitation, trace aortic insufficiency.  Trivial pericardial effusion.    Currently using  [employer] auto CPAP @5-15 cm H20 pressure; compliance reviewed for 3/8/2025 through 4/6/2025, days used 29/30, average daily usage 7 hours 14 minutes, 93% of days greater than or equal to 4 hours, significant mask leak at 29.6 LPM at 95th percentile, AHI 0.5 per hour.  See media for full report.    Device obtained 2/14/2025  DME provider [employer]   Mask interface nasal mask    Polysomnogram obtained 12/1/2024 demonstrated overall AHI of 8.23 increasing to 23.9 during REM sleep and 11.24 while supine. Low O2 sat 78% with sats less than or equal to 88 for 11.2 minutes of recorded time. Findings consistent with mild obstructive sleep  apnea and mild nocturnal hypoxia. CPAP titration versus auto CPAP trial recommended.      Sleep schedule goes to bed 10 PM and wakens 6-7 a.m., bathroom x 1.  Patient reports less frequent awakenings   Symptoms denies day time somnolence and denies morning headache, decreased overall fatigue reported.    [place] Sleepiness Scale reported 16/24 on 11/19/2024  Stop Bang Score reported as 3 on 4/2/2024    Review of Systems   Constitutional:  Positive for malaise/fatigue (when goes to bed late). Negative for chills, fever and weight loss.   HENT:  Positive for congestion (occasional allergies) and tinnitus (occasional crackling). Negative for hearing loss, nosebleeds, sinus pain and sore throat.    Respiratory:  Negative for cough, sputum production, shortness of breath and wheezing.    Cardiovascular:  Negative for chest pain, palpitations, orthopnea and leg swelling.   Gastrointestinal:  Positive for heartburn (only if spicy).        Partial upper, missing back molars, no swallowing issues     Neurological:  Negative for dizziness, tremors and headaches.   Psychiatric/Behavioral:  The patient has insomnia (occasional).        Past Medical History:   Diagnosis Date    Daytime sleepiness     Gasping for breath     Insomnia     Morning headache     Snoring        Past Surgical History:   Procedure Laterality Date    [place] UPPER GI ENDOSCOPY,DIAGNOSIS N/A 4/2/2024    Procedure: GASTROSCOPY;  Surgeon: Rich Jenkins M.D.;  Location: SURGERY SAME DAY [facility];  Service: Gastroenterology    [place] COLONOSCOPY-FLEXIBLE N/A 4/2/2024    Procedure: COLONOSCOPY;  Surgeon: Rich Jenkins M.D.;  Location: SURGERY SAME DAY [facility];  Service: Gastroenterology    [place] UPPER GI ENDOSCOPY,BIOPSY N/A 4/2/2024    Procedure: GASTROSCOPY, WITH BIOPSY;  Surgeon: Rich Jenkins M.D.;  Location: SURGERY SAME DAY [facility];  Service: Gastroenterology    UMBILICAL HERNIA REPAIR      Hernia Repair, Umbilical       No family history on file.    Social  History     Socioeconomic History    Marital status: Single     Spouse name: Not on file    Number of children: Not on file    Years of education: Not on file    Highest education level: Not on file   Occupational History    Not on file   Tobacco Use    Smoking status: Never    Smokeless tobacco: Never   Vaping Use    Vaping status: Never Used   Substance and Sexual Activity    Alcohol use: Never    Drug use: No    Sexual activity: Not on file   Other Topics Concern    Not on file   Social History Narrative    ** Merged History Encounter **          Social Drivers of Health     Financial Resource Strain: Not on file   Food Insecurity: Not on file   Transportation Needs: Not on file   Physical Activity: Not on file   Stress: Not on file   Social Connections: Not on file   Intimate Partner Violence: Not on file   Housing Stability: Not on file       Allergies as of 04/07/2025    (No Known Allergies)          Current medications as of today   Current Outpatient Medications   Medication Sig Dispense Refill    ferrous sulfate 325 (65 Fe) MG tablet Take 325 mg by mouth every 48 hours.      apixaban (ELIQUIS) 5mg Tab Take 1 Tablet by mouth 2 times a day. 60 Tablet 2    lisinopril (PRINIVIL) 20 MG Tab Take 1 Tablet by mouth every day. 30 Tablet 0    rosuvastatin (CRESTOR) 10 MG Tab Take 1 Tablet by mouth every day. 30 Tablet 0     No current facility-administered medications for this visit.         Physical Exam:   Gen:           Alert and oriented, No apparent distress. Mood and affect appropriate, normal interaction with examiner.   Hearing:     Grossly intact.  Nose:          Normal, no lesions or deformities.  Dentition:    Poor dentition.   Oropharynx:   Tongue normal, posterior pharynx without erythema or exudate.  Mallampati Classification: IV  Neck:        Supple, trachea midline, no masses.  Respiratory Effort: No intercostal retractions or use of accessory muscles.   Gait and Station: Normal.  Digits and Nails: No  clubbing, cyanosis, petechiae, or nodes.   Skin:        No rashes, lesions or ulcers noted.               Ext:           No cyanosis or edema.      Immunizations:  Flu: Annual recommended  PCV 20: 9/18/2023  Moderna SARS CoV2 Vaccine: 10/5/2021, 9/14/2021    Assessment / Plan:  1. Obstructive sleep apnea  - DME Mask and Supplies    Reviewed first compliance, demonstrating use and benefit.  Met all requirements.  [facility]  Moderate to severe mask leak noted.  Chinstrap or mouth tape advised to address.  Short-term follow-up to review for improvement in mask leak and controlled apneas.  Patient was reminded to use distilled water only in humidifier chamber, reviewed equipment cleaning, reviewed equipment replacement schedule.  Follow-up in 3 months.    2. Obesity (BMI 30.0-34.9)    Elevated BMI: Discussion regarding impact central adiposity on pulmonary function.  Encouraged to increase activity as tolerated and monitor nutritional intake.      Follow-up:   Return in about 3 months (around 7/7/2025) for Return with Svetlana Cervantes PA-C.    Please note that this dictation was created using voice recognition software. I have made every reasonable attempt to correct obvious errors, but it is possible there are errors of grammar and possibly content that I did not discover before finalizing the note.

## 2025-04-07 NOTE — PATIENT INSTRUCTIONS
1-reviewed first compliance  2-demonstrating use and benefit  3-met all requirements  4-send order for mask and supplies to [employer]  5-moderate to severe mask leak  6-chin strap or mouth tape advised  7-short term follow up to review  8-As a reminder use distilled water only in humidifier chamber.  Fresh fill daily  9-Today we reviewed equipment cleaning  once weekly minimum  mask, tubing and water chamber  use dedicated container  use mild soap and water  SoClean or other ozone  are not recommended  white vinegar and water solution is no longer recommended  hang tubing to dry  mask sanitizing wipes are an option for use   10-Equipment replacement schedule : Mask cushion every month, Head gear every 6 months, Tubing every 3 months, Ultra-fine filters 2 times per month, Humidifier chamber every 6 months  11-follow up in 3 months

## 2025-07-14 ENCOUNTER — OFFICE VISIT (OUTPATIENT)
Dept: SLEEP MEDICINE | Facility: MEDICAL CENTER | Age: 65
End: 2025-07-14
Attending: PHYSICIAN ASSISTANT
Payer: MEDICARE

## 2025-07-14 VITALS
HEART RATE: 64 BPM | WEIGHT: 173 LBS | HEIGHT: 60 IN | BODY MASS INDEX: 33.96 KG/M2 | DIASTOLIC BLOOD PRESSURE: 72 MMHG | OXYGEN SATURATION: 93 % | SYSTOLIC BLOOD PRESSURE: 118 MMHG | RESPIRATION RATE: 16 BRPM

## 2025-07-14 DIAGNOSIS — G47.33 OBSTRUCTIVE SLEEP APNEA: Primary | ICD-10-CM

## 2025-07-14 PROCEDURE — 99213 OFFICE O/P EST LOW 20 MIN: CPT | Performed by: PHYSICIAN ASSISTANT

## 2025-07-14 PROCEDURE — 3074F SYST BP LT 130 MM HG: CPT | Performed by: PHYSICIAN ASSISTANT

## 2025-07-14 PROCEDURE — 3078F DIAST BP <80 MM HG: CPT | Performed by: PHYSICIAN ASSISTANT

## 2025-07-14 ASSESSMENT — ENCOUNTER SYMPTOMS
ORTHOPNEA: 0
SINUS PAIN: 0
DIZZINESS: 1
WEIGHT LOSS: 0
SPUTUM PRODUCTION: 0
COUGH: 0
CHILLS: 0
FEVER: 0
HEARTBURN: 0
HEADACHES: 0
PALPITATIONS: 0
INSOMNIA: 1
SHORTNESS OF BREATH: 0
TREMORS: 0
SORE THROAT: 0
WHEEZING: 0

## 2025-07-14 ASSESSMENT — FIBROSIS 4 INDEX: FIB4 SCORE: 1.03

## 2025-07-14 NOTE — PROGRESS NOTES
Chief Complaint   Patient presents with    Apnea     Last Office Visit 4/7/25 with Svetlana Cervantes P.A.-C.    PAP/O2/OAT: Auto CPAP cmH20 5-15    Set up: 2/14/25       HPI:  Laurie Holliday is a 64 y.o. year old female here today for follow-up on obstructive sleep apnea.  Patient last seen in clinic 4/7/2025 by EFREN Odonnell.  Patient previously evaluated by Dr. Dipak Canales on 11/19/2024.  Patient is Slovak-speaking only and is accompanied by her daughter and family, translation provided by daughter.    Past Medical History: GONZALEZ, bilateral PE, acute respiratory failure, pulmonary hypertension, type 2 diabetes, hypertension, anemia.    Vitals:  /72 (BP Location: Left arm, Patient Position: Sitting, BP Cuff Size: Adult)   Pulse 64   Resp 16   Ht 1.524 m (5')   Wt 78.5 kg (173 lb)   SpO2 93% BMI of 33.79 kg/m².    Recent Imaging: echocardiogram obtained 2/6/2025 demonstrated normal left ventricular chamber size, wall thickness, systolic function.  LVEF estimated at 70%.  Indeterminate diastolic function.  Normal right ventricular size and systolic function.  Agitated saline study with no evidence of right-to-left shunt.  Trace mitral regurgitation, trace aortic insufficiency.  Trivial pericardial effusion.     Chest x-ray obtained 1/6/2025 demonstrated cardiomegaly, hiatal hernia, no evidence of focal infiltrate or pulmonary edema.  No pleural effusion.    Currently using  Resmed auto CPAP @5-15 cm H20 pressure; compliance reviewed for 6/14/2020 25-7 13 2025, days used 30/30, average daily usage 8 hours 15 minutes, 100% of days greater than or equal to 4 hours, mask leak at 23.5 LPM at 95th percentile, AHI 0.5 per hour.  See media for full report.    Device obtained 2/14/2025  DME provider [employer]   Mask interface nasal mask    Polysomnogram obtained 12/1/2024 demonstrated overall AHI of 8.23 increasing to 23.9 during REM sleep and 11.24 while supine. Low O2 sat 78% with sats less  than or equal to 88 for 11.2 minutes of recorded time. Findings consistent with mild obstructive sleep apnea and mild nocturnal hypoxia. CPAP titration versus auto CPAP trial recommended.      Sleep schedule goes to bed 9-10 p.m., wakens 6-7 a.m. , and gets up during the night 0-1 times   Symptoms denies day time somnolence, denies morning headache, and reports improvement in overall fatigue.    Gardners Sleepiness Scale reported at 16/24 on 11/19/2024  Stop Bang Score reported as 3 on 4/2/2024    Review of Systems   Constitutional:  Positive for malaise/fatigue. Negative for chills, fever and weight loss.   HENT:  Positive for tinnitus (sometimes). Negative for congestion, hearing loss, nosebleeds, sinus pain and sore throat.    Respiratory:  Negative for cough, sputum production, shortness of breath and wheezing.    Cardiovascular:  Negative for chest pain, palpitations, orthopnea and leg swelling.   Gastrointestinal:  Negative for heartburn.        Upper denture, some missing molars bottom, some swallowing issues     Neurological:  Positive for dizziness. Negative for tremors and headaches.   Psychiatric/Behavioral:  The patient has insomnia (sommetimes falling asleep).        Past Medical History[1]    Past Surgical History[2]    No family history on file.    Social History     Socioeconomic History    Marital status: Single     Spouse name: Not on file    Number of children: Not on file    Years of education: Not on file    Highest education level: Not on file   Occupational History    Not on file   Tobacco Use    Smoking status: Never    Smokeless tobacco: Never   Vaping Use    Vaping status: Never Used   Substance and Sexual Activity    Alcohol use: Never    Drug use: No    Sexual activity: Not on file   Other Topics Concern    Not on file   Social History Narrative    ** Merged History Encounter **          Social Drivers of Health     Financial Resource Strain: Not on file   Food Insecurity: Not on file    Transportation Needs: Not on file   Physical Activity: Not on file   Stress: Not on file   Social Connections: Not on file   Intimate Partner Violence: Not on file   Housing Stability: Not on file       Allergies as of 07/14/2025    (No Known Allergies)          Current medications as of today Current Medications[3]      Physical Exam:   Gen:           Alert and oriented, No apparent distress. Mood and affect appropriate, normal interaction with examiner.   Hearing:     Grossly intact.  Nose:          Normal, no lesions or deformities.  Dentition:    poor dentition.   Oropharynx:   Tongue normal, posterior pharynx without erythema or exudate.  Mallampati Classification: IV  Neck:        Supple, trachea midline, no masses.  Respiratory Effort: No intercostal retractions or use of accessory muscles.   Gait and Station: Grossly normal.  Digits and Nails: No clubbing, cyanosis, petechiae, or nodes.   Skin:        No rashes, lesions or ulcers noted.               Ext:           No cyanosis or edema.      Immunizations:  Flu: Annual recommended  PCV 20: 9/18/2023  SARS CoV2 Vaccine: 10/5/2021, 9/14/2020    Assessment / Plan:  1. Obstructive sleep apnea    Reviewed compliance demonstrating use and benefit.  Improved mask leak.  Mask and supplies order was sent last visit.  Patient was reminded to use distilled water only in humidifier chamber with fresh fill daily.  Reviewed equipment cleaning and equipment replacement schedule.  Patient to follow-up 9 months sooner if needed.  Family states understanding.      Follow-up:   Return in about 9 months (around 4/14/2026) for Return with Svetlana Cervantes PA-C.    Please note that this dictation was created using voice recognition software. I have made every reasonable attempt to correct obvious errors, but it is possible there are errors of grammar and possibly content that I did not discover before finalizing the note.             [1]   Past Medical History:  Diagnosis Date     Daytime sleepiness     Gasping for breath     Insomnia     Morning headache     Snoring    [2]   Past Surgical History:  Procedure Laterality Date    [place] UPPER GI ENDOSCOPY,DIAGNOSIS N/A 4/2/2024    Procedure: GASTROSCOPY;  Surgeon: Rich Jenkins M.D.;  Location: SURGERY SAME DAY [facility];  Service: Gastroenterology    [place] COLONOSCOPY-FLEXIBLE N/A 4/2/2024    Procedure: COLONOSCOPY;  Surgeon: Rich Jenkins M.D.;  Location: SURGERY SAME DAY [facility];  Service: Gastroenterology    [place] UPPER GI ENDOSCOPY,BIOPSY N/A 4/2/2024    Procedure: GASTROSCOPY, WITH BIOPSY;  Surgeon: Rich Jenkins M.D.;  Location: SURGERY SAME DAY [facility];  Service: Gastroenterology    UMBILICAL HERNIA REPAIR      Hernia Repair, Umbilical   [3]   Current Outpatient Medications   Medication Sig Dispense Refill    ferrous sulfate 325 (65 Fe) MG tablet Take 325 mg by mouth every 48 hours.      apixaban (ELIQUIS) 5mg Tab Take 1 Tablet by mouth 2 times a day. 60 Tablet 2    lisinopril (PRINIVIL) 20 MG Tab Take 1 Tablet by mouth every day. 30 Tablet 0    rosuvastatin (CRESTOR) 10 MG Tab Take 1 Tablet by mouth every day. 30 Tablet 0     No current facility-administered medications for this visit.

## 2025-07-14 NOTE — PATIENT INSTRUCTIONS
1-reviewed compliance demonstrating use and benefit  2-mask and supplies order sent last visit  3-As a reminder use distilled water only in humidifier chamber.  Fresh fill daily  4-Today we reviewed equipment cleaning  once weekly minimum  mask, tubing and water chamber  use dedicated container  use mild soap and water  SoClean or other ozone  are not recommended  white vinegar and water solution is no longer recommended  hang tubing to dry  mask sanitizing wipes are an option for use   5-Equipment replacement schedule : Mask cushion every month, Head gear every 6 months, Tubing every 3 months, Ultra-fine filters 2 times per month, Humidifier chamber every 6 months  6-follow up in 9 months, sooner if needed